# Patient Record
(demographics unavailable — no encounter records)

---

## 2021-08-05 NOTE — EMS
[address]   [address]                     EMS Patient Care Report       
_______________________________________________________________________________
 
Name:       TRISHA GRUBBS                  Room #:                     REG IBRAHIMA MOLINA#:      [id number]                       Account #:      [id number]  
Admission:  21    Attend Phys:                          
Discharge:              Date of Birth:  54  
                                                          Report #: [id number]
                                                                    [id number]
_______________________________________________________________________________
THIS REPORT FOR:   //name//                          
 
Report Transmitted: 2021 22:35
EMS Care Summary
[place], [place]/[facility]
Incident [id number] @ 2021 20:19
 
Incident Location
[address] [place] [address]
314
 
Patient
TRISHA GRUBBS
Female, 67 Years
 1954
 
Patient Address
[address] [place] [address]
 
Patient History
Behavioral/Psychiatric Disorder,Diabetes,Schizophrenia,Anxiety,
 
Patient Allergies
Penicillin allergy,Morphine,Sulfa,
 
Patient Medications
Depakote, Seroquel, Humalog, Insulin, Hydrocodone, Gabapentin, Ativan,
 
Chief Complaint
ALTERED MENTAL STATUS
 
Disposition
Transported No Lights/[place]
 
Dispatch Reason
Falls
 
Transported To
[facility]
 
Narrative
DISPATCHED EMERGENCY TO THE SCENE OF A REPORTED FALL.  UPON ARRIVAL, FOUND 
PATIENT LAYING SUPINE IN BED, ALERT TO VERBAL STIMULI X3.  NURSING STAFF STATES 
THAT THE PATIENT FELL YESTERDAY AND HAS HAD PROGRESSIVE ALTERED MENTAL STATUS 
 
 
 
[address]   [address]                     EMS Patient Care Report       
_______________________________________________________________________________
 
Name:       TRISHA GRUBBS                  Room #:                     REG IBRAHIMA MOLINA#:      [id number]                       Account #:      [id number]  
Admission:  21    Attend Phys:                          
Discharge:              Date of Birth:  54  
                                                          Report #: [id number]
                                                                    [id number]
_______________________________________________________________________________
SINCE.  PATIENT WAS NOT EVALUATED AT THE ER FOR HER FALL.  PATIENT IS ALERT TO 
PERSON BUT LATHARGIC AND CONFUSED OF SITUATION AND EVENTS.  PATIENT IS 
TRANSFERRED TO [facility] AND SECURED.  TRANSPORTED TO AMBULANCE, LOADED, AND SECURED. 
 VITAL SIGNS ASSESSED.  3-LEAD REVEALS SINUS RHYTHM.  12-LEAD REVEALS SINUS 
RHYTHM WITH LEFT BUNDLE BRANCH BLOCK.  20G IV ESABLISHED IN LEFT FOREARM.  
OXYGEN VIA NC AT 4LPM.  PATIENT TRANSPORTED IN POSITION OF COMFORT TO [facility].  VITAL SIGNS ASSESSED DURING TRANSPORT.  PATIENT CARE 
TRANSFERRED TO ED STAFF. 
 
Initial Vitals
@20:41P: 101,R: 18,BP: 110/72,Temp: 97.7F,Glucose: 147,SpO2: 88,
@20:33P: 84,R: 18,BP: 111/72,Pain: 6/10,GCS: 14,SpO2: 88,Revised Trauma: 12,
@20:49P: 131,R: 18,Pain: 6/10,GCS: 14,CO: 0,SpO2: 93,
@20:39P: 82,R: 18,Pain: 6/10,GCS: 14,CO: 0,SpO2: 90,
 
Assessments
@20:28MENTAL:Confused,Person Oriented,SKIN:Hot,HEENT:Head/Face: No 
Abnormalities,Neck/Airway: No Abnormalities,LUNG SOUNDS:General: No 
Abnormalities,ABDOMEN:General: No Abnormalities,PELVIS//GI:No 
Abnormalities,EXTREMITIES:Left Arm: No Abnormalities,Right Arm: No 
Abnormalities,Left Leg: No Abnormalities,Right Leg: No 
Abnormalities,PULSE:Radial: 2+ Normal,NEURO:No Abnormalities, 
 
Impression
Altered Mental Status
 
Procedures
@20:42Oxygen FlowRate: 4 Device: Nasal Cannula (NC) Response: 
UnchangedSucceeded@20:3912-Lead ECGResponse: UnchangedSucceeded@20:28ALS 
AssessmentResponse: UnchangedSucceeded@20:383-Lead ECGResponse: 
UnchangedSucceeded@20:35Saline Lock 10cc (20 ga) Site: Forearm-LeftResponse: 
UnchangedSucceeded 
 
Timeline
20:16,Call Received
20:16,Dispatch Notified
20:19,Dispatched
20:20,En Route
20:25,On Scene
20:28,At Patient
20:28,ALS Assessment,Response: UnchangedSucceeded,
20:33,BP: 111/72 M,PULSE: 84,RR: 18 R,SPO2: 88 Ox,ETCO2:  ,BG: ,PAIN: 6,GCS: 14,
20:35,Saline Lock 10cc 20 ga Site: Forearm-Left,Response: UnchangedSucceeded,
20:38,3-Lead ECG,Response: UnchangedSucceeded,
20:39,12-Lead ECG,Response: UnchangedSucceeded,
20:39,BP: / M,PULSE: 82,RR: 18 R,SPO2: 90 Ox,ETCO2:  ,BG: ,PAIN: 6,GCS: 14,
 
 
 
[address]   [address]                     EMS Patient Care Report       
_______________________________________________________________________________
 
Name:       TRISHA GRUBBS                  Room #:                     REG ER  
M.R.#:      [id number]                       Account #:      [id number]  
Admission:  21    Attend Phys:                          
Discharge:              Date of Birth:  54  
                                                          Report #: [id number]
                                                                    [id number]
_______________________________________________________________________________
20:41,BP: 110/72 M,PULSE: 101,RR: 18 R,SPO2: 88 Ox,ETCO2:  ,B,PAIN: ,GCS: 
, 
20:42,Oxygen FlowRate: 4 Device: Nasal Cannula (NC) Response: 
UnchangedSucceeded, 
20:43,Depart Scene
20:49,BP: / M,PULSE: 131,RR: 18 R,SPO2: 93 Ox,ETCO2:  ,BG: ,PAIN: 6,GCS: 14,
20:50,At Destination
21:06,Call Closed
 
Disclaimer
v1.1     Copyright  [employer]
This EMS Care Summary contains data elements from the applicable legal record 
(which may be displayed differently). It is designed to provide pertinent 
information for the following purposes: continuity of care, clinical quality, 
and state data reporting. The complete legal record is available to ED staff 
and administrators of the receiving hospital in [email or web address]'s Patient Tracker. All data 
is provided "as is."

## 2021-08-05 NOTE — EMS
[address]   [address]                     EMS Patient Care Report       
_______________________________________________________________________________
 
Name:       TRISHA GRUBBS                  Room #:                     REG IBRAHIMA MOLINA#:      [id number]                       Account #:      [id number]  
Admission:  21    Attend Phys:                          
Discharge:              Date of Birth:  54  
                                                          Report #: [id number]
                                                                    [id number]
_______________________________________________________________________________
THIS REPORT FOR:   //name//                          
 
Report Transmitted: 2021 21:38
EMS Care Summary
[place], [place]/[facility]
Incident [id number] @ 2021 20:19
 
Incident Location
[address] [place] [address]
 
Patient
TRISHA GRUBBS
Female, 67 Years
 1954
 
Patient Address
[address] [place] [address]
 
Patient History
Behavioral/Psychiatric Disorder,Diabetes,Schizophrenia,Anxiety,
 
Patient Allergies
Penicillin allergy,Morphine,Sulfa,
 
Patient Medications
Depakote, Seroquel, Humalog, Insulin, Hydrocodone, Gabapentin, Ativan,
 
Chief Complaint
ALTERED MENTAL STATUS
 
Disposition
Transported No Lights/[place]
 
Dispatch Reason
Falls
 
Transported To
[facility]
 
Narrative
DISPATCHED EMERGENCY TO THE SCENE OF A REPORTED FALL.  UPON ARRIVAL, FOUND 
PATIENT LAYING SUPINE IN BED, ALERT TO VERBAL STIMULI X3.  NURSING STAFF STATES 
THAT THE PATIENT FELL YESTERDAY AND HAS HAD PROGRESSIVE ALTERED MENTAL STATUS 
 
 
 
[address]   [address]                     EMS Patient Care Report       
_______________________________________________________________________________
 
Name:       TRISHA GRUBBS                  Room #:                     REG IBRAHIMA MOLINA#:      [id number]                       Account #:      [id number]  
Admission:  21    Attend Phys:                          
Discharge:              Date of Birth:  54  
                                                          Report #: [id number]
                                                                    [id number]
_______________________________________________________________________________
SINCE.  PATIENT WAS NOT EVALUATED AT THE ER FOR HER FALL.  PATIENT IS ALERT TO 
PERSON BUT LATHARGIC AND CONFUSED OF SITUATION AND EVENTS.  PATIENT IS 
TRANSFERRED TO [facility] AND SECURED.  TRANSPORTED TO AMBULANCE, LOADED, AND SECURED. 
 VITAL SIGNS ASSESSED.  3-LEAD REVEALS SINUS RHYTHM.  12-LEAD REVEALS SINUS 
RHYTHM WITH LEFT BUNDLE BRANCH BLOCK.  20G IV ESABLISHED IN LEFT FOREARM.  
OXYGEN VIA NC AT 4LPM.  PATIENT TRANSPORTED IN POSITION OF COMFORT TO [facility].  VITAL SIGNS ASSESSED DURING TRANSPORT.  PATIENT CARE 
TRANSFERRED TO ED STAFF. 
 
Initial Vitals
@20:41P: 101,R: 18,BP: 110/72,Temp: 97.7F,Glucose: 147,SpO2: 88,
@20:33P: 84,R: 18,BP: 111/72,Pain: 6/10,GCS: 14,SpO2: 88,Revised Trauma: 12,
@20:49P: 131,R: 18,Pain: 6/10,GCS: 14,CO: 0,SpO2: 93,
@20:39P: 82,R: 18,Pain: 6/10,GCS: 14,CO: 0,SpO2: 90,
 
Assessments
@20:28MENTAL:Confused,Person Oriented,SKIN:Hot,HEENT:Head/Face: No 
Abnormalities,Neck/Airway: No Abnormalities,LUNG SOUNDS:General: No 
Abnormalities,ABDOMEN:General: No Abnormalities,PELVIS//GI:No 
Abnormalities,EXTREMITIES:Left Arm: No Abnormalities,Right Arm: No 
Abnormalities,Left Leg: No Abnormalities,Right Leg: No 
Abnormalities,PULSE:Radial: 2+ Normal,NEURO:No Abnormalities, 
 
Impression
Altered Mental Status
 
Procedures
@20:42Oxygen FlowRate: 4 Device: Nasal Cannula (NC) Response: 
UnchangedSucceeded@20:3912-Lead ECGResponse: UnchangedSucceeded@20:28ALS 
AssessmentResponse: UnchangedSucceeded@20:383-Lead ECGResponse: 
UnchangedSucceeded@20:35Saline Lock 10cc (20 ga) Site: Forearm-LeftResponse: 
UnchangedSucceeded 
 
Timeline
20:16,Call Received
20:16,Dispatch Notified
20:19,Dispatched
20:20,En Route
20:25,On Scene
20:28,At Patient
20:28,ALS Assessment,Response: UnchangedSucceeded,
20:33,BP: 111/72 M,PULSE: 84,RR: 18 R,SPO2: 88 Ox,ETCO2:  ,BG: ,PAIN: 6,GCS: 14,
20:35,Saline Lock 10cc 20 ga Site: Forearm-Left,Response: UnchangedSucceeded,
20:38,3-Lead ECG,Response: UnchangedSucceeded,
20:39,12-Lead ECG,Response: UnchangedSucceeded,
20:39,BP: / M,PULSE: 82,RR: 18 R,SPO2: 90 Ox,ETCO2:  ,BG: ,PAIN: 6,GCS: 14,
 
 
 
[address]   [address]                     EMS Patient Care Report       
_______________________________________________________________________________
 
Name:       TRISHA GRUBBS                  Room #:                     REG ER  
M.R.#:      [id number]                       Account #:      [id number]  
Admission:  21    Attend Phys:                          
Discharge:              Date of Birth:  54  
                                                          Report #: [id number]
                                                                    [id number]
_______________________________________________________________________________
20:41,BP: 110/72 M,PULSE: 101,RR: 18 R,SPO2: 88 Ox,ETCO2:  ,B,PAIN: ,GCS: 
, 
20:42,Oxygen FlowRate: 4 Device: Nasal Cannula (NC) Response: 
UnchangedSucceeded, 
20:43,Depart Scene
20:49,BP: / M,PULSE: 131,RR: 18 R,SPO2: 93 Ox,ETCO2:  ,BG: ,PAIN: 6,GCS: 14,
20:50,At Destination
21:06,Call Closed
 
Disclaimer
v1.1     Copyright  [employer]
This EMS Care Summary contains data elements from the applicable legal record 
(which may be displayed differently). It is designed to provide pertinent 
information for the following purposes: continuity of care, clinical quality, 
and state data reporting. The complete legal record is available to ED staff 
and administrators of the receiving hospital in [employer]'s Patient Tracker. All data 
is provided "as is."

## 2021-08-06 NOTE — EKG
[place] [address] [facility]
[place]  [address]
Phone:  [phone]                    ELECTROCARDIOGRAM REPORT      
_______________________________________________________________________________
 
Name:       TRISHA GRUBBS                  Room #:                     KEISHA MOLINA#:      [id number]     Account #:      [id number]  
Admission:  21    Attend Phys:                          
Discharge:  21    Date of Birth:  54  
                                                          Report #: [id number]
   [id number]
_______________________________________________________________________________
                         [facility] ED
                                       
Test Date:    2021               Test Time:    22:24:44
Pat Name:     TRISHA GRUBBS             Department:   
Patient ID:   [id number]            Room:          
Gender:       F                        Technician:   gena matthews
:          1954               Requested By: Moustapha Kelly
Order Number: [id number] MD:   Arthur Jacome
                                 Measurements
Intervals                              Axis          
Rate:         70                       P:            38
MS:           180                      QRS:          -2
QRSD:         154                      T:            137
QT:           444                                    
QTc:          480                                    
                           Interpretive Statements
Sinus rhythm
Left bundle branch block
Compared to ECG 2017 12:32:30
Ectopic atrial rhythm no longer present
Electronically Signed On 2021 7:19:43 CDT by Arthur Jacome
[email or web address]
 
 
 
 
 
 
 
 
 
 
 
 
 
 
 
 
 
 
 
 
 
  <ELECTRONICALLY SIGNED>
   By: Arthur Jacome MD, formerly [facility]    
  21     0719
D: 21 2224                           _____________________________________
T: 21 2224                           Arthur Jacome MD, FACC      /EPI